# Patient Record
Sex: MALE | URBAN - METROPOLITAN AREA
[De-identification: names, ages, dates, MRNs, and addresses within clinical notes are randomized per-mention and may not be internally consistent; named-entity substitution may affect disease eponyms.]

---

## 2018-01-05 ENCOUNTER — APPOINTMENT (RX ONLY)
Dept: URBAN - METROPOLITAN AREA CLINIC 157 | Facility: CLINIC | Age: 33
Setting detail: DERMATOLOGY
End: 2018-01-05

## 2018-01-05 VITALS — SYSTOLIC BLOOD PRESSURE: 154 MMHG | WEIGHT: 185 LBS | DIASTOLIC BLOOD PRESSURE: 101 MMHG | HEART RATE: 91 BPM

## 2018-01-05 DIAGNOSIS — L73.1 PSEUDOFOLLICULITIS BARBAE: ICD-10-CM | Status: WELL CONTROLLED

## 2018-01-05 PROCEDURE — ? COUNSELING

## 2018-01-05 PROCEDURE — ? TREATMENT REGIMEN

## 2018-01-05 PROCEDURE — 99201: CPT

## 2018-01-05 PROCEDURE — ? RECOMMENDATIONS

## 2018-01-05 PROCEDURE — ? PRESCRIPTION

## 2018-01-05 RX ORDER — MINOCYCLINE HYDROCHLORIDE 100 MG/1
CAPSULE ORAL
Qty: 60 | Refills: 2 | Status: ERX | COMMUNITY
Start: 2018-01-05

## 2018-01-05 RX ADMIN — MINOCYCLINE HYDROCHLORIDE: 100 CAPSULE ORAL at 16:03

## 2018-01-05 ASSESSMENT — LOCATION DETAILED DESCRIPTION DERM
LOCATION DETAILED: RIGHT SUPERIOR LATERAL BUCCAL CHEEK
LOCATION DETAILED: RIGHT LATERAL MANDIBULAR CHEEK
LOCATION DETAILED: RIGHT CHIN
LOCATION DETAILED: LEFT CHIN
LOCATION DETAILED: PHILTRUM
LOCATION DETAILED: LEFT SUPERIOR LATERAL BUCCAL CHEEK

## 2018-01-05 ASSESSMENT — LOCATION ZONE DERM
LOCATION ZONE: FACE
LOCATION ZONE: LIP

## 2018-01-05 ASSESSMENT — LOCATION SIMPLE DESCRIPTION DERM
LOCATION SIMPLE: LEFT CHEEK
LOCATION SIMPLE: RIGHT CHEEK
LOCATION SIMPLE: CHIN
LOCATION SIMPLE: UPPER LIP

## 2018-01-05 NOTE — PROCEDURE: TREATMENT REGIMEN
Otc Regimen: Differin 0.1% gel apply by topical route at bedtime as needed for flares
Detail Level: Simple
Continue Regimen: Minocycline 100mg capsule take 1 capsule by topical route capsule by oral route twice daily as needed for flares

## 2018-01-05 NOTE — PROCEDURE: RECOMMENDATIONS
Recommendations (Free Text): Keep beard trimmed at 1/8 inch in length
Detail Level: Simple
Recommendation Preamble: The following recommendations were made during the visit: